# Patient Record
Sex: FEMALE | Race: WHITE | URBAN - METROPOLITAN AREA
[De-identification: names, ages, dates, MRNs, and addresses within clinical notes are randomized per-mention and may not be internally consistent; named-entity substitution may affect disease eponyms.]

---

## 2020-10-26 ENCOUNTER — OFFICE VISIT (OUTPATIENT)
Dept: URBAN - METROPOLITAN AREA CLINIC 128 | Facility: CLINIC | Age: 65
End: 2020-10-26
Payer: COMMERCIAL

## 2020-10-26 DIAGNOSIS — D50.0 IRON DEFICIENCY ANEMIA SECONDARY TO BLOOD LOSS (CHRONIC): ICD-10-CM

## 2020-10-26 DIAGNOSIS — B19.20 HEPATITIS C VIRUS INFECTION WITHOUT HEPATIC COMA, UNSPECIFIED CHRONICITY: ICD-10-CM

## 2020-10-26 PROCEDURE — G8484 FLU IMMUNIZE NO ADMIN: HCPCS | Performed by: INTERNAL MEDICINE

## 2020-10-26 PROCEDURE — 99204 OFFICE O/P NEW MOD 45 MIN: CPT | Performed by: INTERNAL MEDICINE

## 2020-10-26 PROCEDURE — 99244 OFF/OP CNSLTJ NEW/EST MOD 40: CPT | Performed by: INTERNAL MEDICINE

## 2020-10-26 RX ORDER — AMLODIPINE BESYLATE 5 MG/1
1 TABLET TABLET ORAL ONCE A DAY
Status: ACTIVE | COMMUNITY

## 2020-10-26 RX ORDER — OXYCODONE HYDROCHLORIDE 10 MG/1
1 TABLET AS NEEDED TABLET ORAL
Status: ACTIVE | COMMUNITY

## 2020-10-26 RX ORDER — LISINOPRIL 20 MG/1
1 TABLET TABLET ORAL ONCE A DAY
Status: ACTIVE | COMMUNITY

## 2020-10-26 RX ORDER — SUCRALFATE 1 G/1
1 TABLET ON AN EMPTY STOMACH TABLET ORAL TWICE A DAY
Status: ACTIVE | COMMUNITY

## 2020-10-26 RX ORDER — OMEPRAZOLE 40 MG/1
1 CAPSULE 30 MINUTES BEFORE MORNING MEAL CAPSULE, DELAYED RELEASE ORAL ONCE A DAY
Status: ACTIVE | COMMUNITY

## 2020-10-26 NOTE — HPI-TODAY'S VISIT:
Mrs. Spears is a 65 year old female who referred by Dr. Malagon. Mrs. Spears was admitted to [facility] 10/20/2020 to 10/22/2020 for acute GI bleed.    Mrs. Spears had endocarditis for which her AV was replaced 2008 and subsequent AVR for aortic stentosis in 5/2019.   Her melena started on 10/17/2020.   She had an EGD on 10/21/2020 with another practice for which she had an EGD performed that showed clean based esophageal ulcerations, irregular z-line, small hiatal hernia, and small fundic gland polyps. She was prescribed protonix 40mg BID and carafate QID.   She received 2 units of pRBC. Her HgB on discharge was 8.6.   She reports having Hep C and was treated in 2009 for 3 months with interferon and ribavirn; she is interested in getting a HCV viral load today.   Prior history is summarized below: -On 12/19/2016 EGD by Dr. Malloy at [place] showed distal esophagitis with linear ulceration, small hiatal hernia, and gastritis. -She had achalasia surgery at [facility]

## 2020-11-03 LAB
A/G RATIO: 1.9
ALBUMIN: 3.9
ALKALINE PHOSPHATASE: 56
ALT (SGPT): 18
AST (SGOT): 25
BILIRUBIN, TOTAL: 0.2
BUN/CREATININE RATIO: 18
BUN: 19
CALCIUM: 9.3
CARBON DIOXIDE, TOTAL: 26
CHLORIDE: 103
CREATININE: 1.03
EGFR IF AFRICN AM: 65
EGFR IF NONAFRICN AM: 57
FERRITIN, SERUM: 46
GLOBULIN, TOTAL: 2.1
GLUCOSE: 89
HCV AB: >11
HCV LOG10: (no result)
HEMATOCRIT: 30.4
HEMOGLOBIN: 9.9
HEPATITIS C QUANTITATION: (no result)
INTERPRETATION:: (no result)
IRON BIND.CAP.(TIBC): 294
IRON SATURATION: 13
IRON: 39
MCH: 30.7
MCHC: 32.6
MCV: 94
NRBC: (no result)
PLATELETS: 185
POTASSIUM: 4
PROTEIN, TOTAL: 6
RBC: 3.23
RDW: 13.6
SODIUM: 142
TEST INFORMATION:: (no result)
UIBC: 255
WBC: 3.8

## 2020-11-04 ENCOUNTER — TELEPHONE ENCOUNTER (OUTPATIENT)
Dept: URBAN - METROPOLITAN AREA CLINIC 92 | Facility: CLINIC | Age: 65
End: 2020-11-04

## 2020-11-04 PROBLEM — 724556004: Status: ACTIVE | Noted: 2020-10-26

## 2020-12-01 ENCOUNTER — OFFICE VISIT (OUTPATIENT)
Dept: URBAN - METROPOLITAN AREA SURGERY CENTER 31 | Facility: SURGERY CENTER | Age: 65
End: 2020-12-01
Payer: COMMERCIAL

## 2020-12-01 ENCOUNTER — CLAIMS CREATED FROM THE CLAIM WINDOW (OUTPATIENT)
Dept: URBAN - METROPOLITAN AREA CLINIC 4 | Facility: CLINIC | Age: 65
End: 2020-12-01
Payer: COMMERCIAL

## 2020-12-01 DIAGNOSIS — K63.89 OTHER SPECIFIED DISEASES OF INTESTINE: ICD-10-CM

## 2020-12-01 DIAGNOSIS — K31.89 OTHER DISEASES OF STOMACH AND DUODENUM: ICD-10-CM

## 2020-12-01 DIAGNOSIS — K31.89 ACQUIRED DEFORMITY OF DUODENUM: ICD-10-CM

## 2020-12-01 DIAGNOSIS — K63.5 BENIGN COLON POLYP: ICD-10-CM

## 2020-12-01 DIAGNOSIS — D50.9 ANEMIA, IRON DEFICIENCY: ICD-10-CM

## 2020-12-01 PROCEDURE — 88342 IMHCHEM/IMCYTCHM 1ST ANTB: CPT | Performed by: PATHOLOGY

## 2020-12-01 PROCEDURE — 45380 COLONOSCOPY AND BIOPSY: CPT | Performed by: INTERNAL MEDICINE

## 2020-12-01 PROCEDURE — G9937 DIG OR SURV COLSCO: HCPCS | Performed by: INTERNAL MEDICINE

## 2020-12-01 PROCEDURE — 88305 TISSUE EXAM BY PATHOLOGIST: CPT | Performed by: PATHOLOGY

## 2020-12-01 PROCEDURE — G8907 PT DOC NO EVENTS ON DISCHARG: HCPCS | Performed by: INTERNAL MEDICINE

## 2020-12-01 PROCEDURE — 43239 EGD BIOPSY SINGLE/MULTIPLE: CPT | Performed by: INTERNAL MEDICINE

## 2020-12-01 PROCEDURE — 45385 COLONOSCOPY W/LESION REMOVAL: CPT | Performed by: INTERNAL MEDICINE

## 2020-12-01 PROCEDURE — 88312 SPECIAL STAINS GROUP 1: CPT | Performed by: PATHOLOGY

## 2020-12-01 RX ORDER — OMEPRAZOLE 40 MG/1
1 CAPSULE 30 MINUTES BEFORE MORNING MEAL CAPSULE, DELAYED RELEASE ORAL ONCE A DAY
Status: ACTIVE | COMMUNITY

## 2020-12-01 RX ORDER — LISINOPRIL 20 MG/1
1 TABLET TABLET ORAL ONCE A DAY
Status: ACTIVE | COMMUNITY

## 2020-12-01 RX ORDER — OXYCODONE HYDROCHLORIDE 10 MG/1
1 TABLET AS NEEDED TABLET ORAL
Status: ACTIVE | COMMUNITY

## 2020-12-01 RX ORDER — SUCRALFATE 1 G/1
1 TABLET ON AN EMPTY STOMACH TABLET ORAL TWICE A DAY
Status: ACTIVE | COMMUNITY

## 2020-12-01 RX ORDER — AMLODIPINE BESYLATE 5 MG/1
1 TABLET TABLET ORAL ONCE A DAY
Status: ACTIVE | COMMUNITY

## 2021-01-21 ENCOUNTER — WEB ENCOUNTER (OUTPATIENT)
Dept: URBAN - METROPOLITAN AREA CLINIC 128 | Facility: CLINIC | Age: 66
End: 2021-01-21

## 2021-01-21 ENCOUNTER — WEB ENCOUNTER (OUTPATIENT)
Dept: URBAN - METROPOLITAN AREA CLINIC 19 | Facility: CLINIC | Age: 66
End: 2021-01-21

## 2021-02-02 ENCOUNTER — WEB ENCOUNTER (OUTPATIENT)
Dept: URBAN - METROPOLITAN AREA CLINIC 128 | Facility: CLINIC | Age: 66
End: 2021-02-02

## 2021-02-11 ENCOUNTER — WEB ENCOUNTER (OUTPATIENT)
Dept: URBAN - METROPOLITAN AREA CLINIC 19 | Facility: CLINIC | Age: 66
End: 2021-02-11

## 2021-02-11 ENCOUNTER — OFFICE VISIT (OUTPATIENT)
Dept: URBAN - METROPOLITAN AREA CLINIC 19 | Facility: CLINIC | Age: 66
End: 2021-02-11
Payer: COMMERCIAL

## 2021-02-11 DIAGNOSIS — R12 HEARTBURN: ICD-10-CM

## 2021-02-11 PROCEDURE — G8427 DOCREV CUR MEDS BY ELIG CLIN: HCPCS | Performed by: INTERNAL MEDICINE

## 2021-02-11 PROCEDURE — 1036F TOBACCO NON-USER: CPT | Performed by: INTERNAL MEDICINE

## 2021-02-11 PROCEDURE — 99214 OFFICE O/P EST MOD 30 MIN: CPT | Performed by: INTERNAL MEDICINE

## 2021-02-11 PROCEDURE — G8484 FLU IMMUNIZE NO ADMIN: HCPCS | Performed by: INTERNAL MEDICINE

## 2021-02-11 RX ORDER — LISINOPRIL 20 MG/1
1 TABLET TABLET ORAL ONCE A DAY
Status: ACTIVE | COMMUNITY

## 2021-02-11 RX ORDER — SUCRALFATE 1 G/1
1 TABLET ON AN EMPTY STOMACH TABLET ORAL TWICE A DAY
Status: ACTIVE | COMMUNITY

## 2021-02-11 RX ORDER — AMLODIPINE BESYLATE 5 MG/1
1 TABLET TABLET ORAL ONCE A DAY
Status: ACTIVE | COMMUNITY

## 2021-02-11 RX ORDER — OMEPRAZOLE 40 MG/1
1 CAPSULE 30 MINUTES BEFORE MORNING MEAL CAPSULE, DELAYED RELEASE ORAL ONCE A DAY
Status: ACTIVE | COMMUNITY

## 2021-02-11 RX ORDER — OXYCODONE HYDROCHLORIDE 10 MG/1
1 TABLET AS NEEDED TABLET ORAL
Status: ACTIVE | COMMUNITY

## 2021-02-11 NOTE — HPI-TODAY'S VISIT:
Mrs. Spears is a 65 year old female who was last seen in GI clinic on 10/26/2020.    Labs on 11/3/2020 showed HgB 9.9 (normal range 11.1-15.9), AST 25, ALT 18, Alk phos 56, T bili 0.2,  ferritin 46 (normal range is ) iron saturation 13%. On 11/3/2020 HCV viral load was negative.   On 12/1/2020 she had an EGD and colonoscopy. The EGD showed medium sized hiatal hernia, endoscopic appearance of prior GE junction surgery with loosen Nissen fundoplication, 10 mm gastric antral polyp, and mild gastritis. The colonoscopy showed a 6 mm ascending colon polyp and a 3 mm sigmoid colon polyp. Repeat colonoscopy recommended in 5 years.   The gastric biopsies were negative for H. pylori and duodena biopsies were negative for Celiac disease. Ascending colon poyps were benign mucosal polyp and hyperplastic polyp.   She reports taking omeprazole 40mg daily and carafate 1 gram BID. She reports having persists reflux symptoms.   Prior history is summarized below: -On 12/19/2016 EGD by Dr. Malloy at [place] showed distal esophagitis with linear ulceration, small hiatal hernia, and gastritis. -She had achalasia surgery at [place] -Mrs. Spears was admitted to [facility] 10/20/2020 to 10/22/2020 for acute GI bleed.   -Mrs. Spears had endocarditis for which her AV was replaced  2008 and subsequent AVR for aortic stentosis in 5/2019.  -Her melena started on 10/17/2020. She had an EGD on 10/21/2020 with another practice for which she had an EGD performed that showed clean based esophageal ulcerations, irregular z-line, small hiatal hernia, and small fundic gland polyps. She was prescribed protonix 40mg BID and carafate QID. She received 2 units of pRBC. Her HgB on discharge was 8.6.  -She reports having Hep C and was treated in 2009 for 3 months with interferon and ribavirin.

## 2021-02-26 ENCOUNTER — TELEPHONE ENCOUNTER (OUTPATIENT)
Dept: URBAN - METROPOLITAN AREA CLINIC 105 | Facility: CLINIC | Age: 66
End: 2021-02-26

## 2021-03-01 ENCOUNTER — TELEPHONE ENCOUNTER (OUTPATIENT)
Dept: URBAN - METROPOLITAN AREA CLINIC 92 | Facility: CLINIC | Age: 66
End: 2021-03-01

## 2021-03-01 ENCOUNTER — LAB OUTSIDE AN ENCOUNTER (OUTPATIENT)
Dept: URBAN - METROPOLITAN AREA CLINIC 19 | Facility: CLINIC | Age: 66
End: 2021-03-01

## 2021-03-10 ENCOUNTER — OFFICE VISIT (OUTPATIENT)
Dept: URBAN - METROPOLITAN AREA MEDICAL CENTER 28 | Facility: MEDICAL CENTER | Age: 66
End: 2021-03-10
Payer: COMMERCIAL

## 2021-03-10 DIAGNOSIS — R12 BURNING REFLUX: ICD-10-CM

## 2021-03-10 DIAGNOSIS — K44.9 DIAPHRAGMATIC HERNIA: ICD-10-CM

## 2021-03-10 PROCEDURE — 91010 ESOPHAGUS MOTILITY STUDY: CPT | Performed by: INTERNAL MEDICINE

## 2021-03-15 ENCOUNTER — TELEPHONE ENCOUNTER (OUTPATIENT)
Dept: URBAN - METROPOLITAN AREA CLINIC 19 | Facility: CLINIC | Age: 66
End: 2021-03-15

## 2021-03-15 ENCOUNTER — TELEPHONE ENCOUNTER (OUTPATIENT)
Dept: URBAN - METROPOLITAN AREA CLINIC 92 | Facility: CLINIC | Age: 66
End: 2021-03-15

## 2021-03-17 ENCOUNTER — TELEPHONE ENCOUNTER (OUTPATIENT)
Dept: URBAN - METROPOLITAN AREA CLINIC 19 | Facility: CLINIC | Age: 66
End: 2021-03-17

## 2021-03-26 ENCOUNTER — OFFICE VISIT (OUTPATIENT)
Dept: URBAN - METROPOLITAN AREA MEDICAL CENTER 25 | Facility: MEDICAL CENTER | Age: 66
End: 2021-03-26
Payer: COMMERCIAL

## 2021-03-26 DIAGNOSIS — R93.3 ABN FINDINGS-GI TRACT: ICD-10-CM

## 2021-03-26 DIAGNOSIS — K31.89 ACQUIRED DEFORMITY OF DUODENUM: ICD-10-CM

## 2021-03-26 DIAGNOSIS — R13.10 ABNORMAL SWALLOWING: ICD-10-CM

## 2021-03-26 PROCEDURE — 43249 ESOPH EGD DILATION <30 MM: CPT | Performed by: INTERNAL MEDICINE

## 2021-03-26 PROCEDURE — 43239 EGD BIOPSY SINGLE/MULTIPLE: CPT | Performed by: INTERNAL MEDICINE

## 2021-03-26 RX ORDER — SUCRALFATE 1 G/1
1 TABLET ON AN EMPTY STOMACH TABLET ORAL TWICE A DAY
Status: ACTIVE | COMMUNITY

## 2021-03-26 RX ORDER — OXYCODONE HYDROCHLORIDE 10 MG/1
1 TABLET AS NEEDED TABLET ORAL
Status: ACTIVE | COMMUNITY

## 2021-03-26 RX ORDER — AMLODIPINE BESYLATE 5 MG/1
1 TABLET TABLET ORAL ONCE A DAY
Status: ACTIVE | COMMUNITY

## 2021-03-26 RX ORDER — LISINOPRIL 20 MG/1
1 TABLET TABLET ORAL ONCE A DAY
Status: ACTIVE | COMMUNITY

## 2021-03-26 RX ORDER — OMEPRAZOLE 40 MG/1
1 CAPSULE 30 MINUTES BEFORE MORNING MEAL CAPSULE, DELAYED RELEASE ORAL ONCE A DAY
Status: ACTIVE | COMMUNITY

## 2021-04-01 ENCOUNTER — TELEPHONE ENCOUNTER (OUTPATIENT)
Dept: URBAN - METROPOLITAN AREA CLINIC 92 | Facility: CLINIC | Age: 66
End: 2021-04-01

## 2021-04-02 ENCOUNTER — OFFICE VISIT (OUTPATIENT)
Dept: URBAN - METROPOLITAN AREA SURGERY CENTER 31 | Facility: SURGERY CENTER | Age: 66
End: 2021-04-02

## 2021-05-19 ENCOUNTER — OFFICE VISIT (OUTPATIENT)
Dept: URBAN - METROPOLITAN AREA CLINIC 19 | Facility: CLINIC | Age: 66
End: 2021-05-19
Payer: COMMERCIAL

## 2021-05-19 DIAGNOSIS — R12 HEARTBURN: ICD-10-CM

## 2021-05-19 DIAGNOSIS — K22.0 ACHALASIA: ICD-10-CM

## 2021-05-19 PROCEDURE — 99214 OFFICE O/P EST MOD 30 MIN: CPT | Performed by: INTERNAL MEDICINE

## 2021-05-19 RX ORDER — SUCRALFATE 1 G/1
1 TABLET ON AN EMPTY STOMACH TABLET ORAL TWICE A DAY
Status: ACTIVE | COMMUNITY

## 2021-05-19 RX ORDER — OMEPRAZOLE 40 MG/1
1 CAPSULE 30 MINUTES BEFORE MORNING MEAL CAPSULE, DELAYED RELEASE ORAL ONCE A DAY
Status: ACTIVE | COMMUNITY

## 2021-05-19 RX ORDER — AMLODIPINE BESYLATE 5 MG/1
1 TABLET TABLET ORAL ONCE A DAY
Status: ACTIVE | COMMUNITY

## 2021-05-19 RX ORDER — PANTOPRAZOLE SODIUM 40 MG/1
1 TABLET TABLET, DELAYED RELEASE ORAL BID
Qty: 60 TABLET | Refills: 5 | OUTPATIENT
Start: 2021-05-19

## 2021-05-19 RX ORDER — OXYCODONE HYDROCHLORIDE 10 MG/1
1 TABLET AS NEEDED TABLET ORAL
Status: ACTIVE | COMMUNITY

## 2021-05-19 RX ORDER — LISINOPRIL 20 MG/1
1 TABLET TABLET ORAL ONCE A DAY
Status: ACTIVE | COMMUNITY

## 2021-05-19 NOTE — HPI-TODAY'S VISIT:
Mrs. Spears is a 66 year old female who was last seen in GI clinic on 2/11/2021.     She reports taking omeprazole 40mg BID and carafate 1 gram BID. She reports having persists reflux symptoms.   On 3/1/2021 she had a barium swallow performed which showed "mid to distal esophageal stricture above the level of a small hiatal hernia. The 12.5 mm compressed barium tablet comes lodged in this location and does not pass into the stomach. Consider corrleation with endoscopy. Severe esophageal dysmotility. Minimal gastroesophageal reflux."   On 3/10/2021 she had an esophageal manometry which showed "overall, abnormal esophageal function study for low LESP and low amplitude contractions that are aperistaltic with decrease in viscous bolus transit. These findings can be seen with pump failure related to GERD, scleroderma or in previously treated achalasia. Correlation with barium swallow suggested."   On 3/26/2021 she had an EGD which showed a medium sized hiatal hernia, dilation to 20 mm was performed, and a 10 mm gastric antral polyp was found in the gastric antrum. Gastric biopsies were negative for H. pylori and lower esophageal biopsies were unremarkable.   She reports her dysphagia has improved since her EGD but continues to have reflux symptoms.   Prior history is summarized below: -On 12/19/2016 EGD by Dr. Malloy at [place] showed distal esophagitis with linear ulceration, small hiatal hernia, and gastritis. -She had achalasia surgery at [place] -Mrs. Spears was admitted to [facility] 10/20/2020 to 10/22/2020 for acute GI bleed. -Mrs. Spears had endocarditis for which her AV was replaced  2008 and subsequent AVR for aortic stentosis in 5/2019.  -Her melena started on 10/17/2020. She had an EGD on 10/21/2020 with another practice for which she had an EGD performed that showed clean based esophageal ulcerations, irregular z-line, small hiatal hernia, and small fundic gland polyps. She was prescribed protonix 40mg BID and carafate QID. She received 2 units of pRBC. Her HgB on discharge was 8.6.  -She reports having Hep C and was treated in 2009 for 3 months with interferon and ribavirin. Labs on 11/3/2020 showed HgB 9.9 (normal range 11.1-15.9), AST 25, ALT 18, Alk phos 56, T bili 0.2,  ferritin 46 (normal range is ) iron saturation 13%. On 11/3/2020 HCV viral load was negative.  -On 12/1/2020 she had an EGD and colonoscopy. The EGD showed medium sized hiatal hernia, endoscopic appearance of prior GE junction surgery with loosen Nissen fundoplication, 10 mm gastric antral polyp, and mild gastritis. The colonoscopy showed a 6 mm ascending colon polyp and a 3 mm sigmoid colon polyp. Repeat colonoscopy recommended in 5 years. The gastric biopsies were negative for H. pylori and duodenal biopsies were negative for Celiac disease. Ascending colon polyps were benign mucosal polyp and hyperplastic polyp.

## 2021-09-03 ENCOUNTER — TELEPHONE ENCOUNTER (OUTPATIENT)
Dept: URBAN - METROPOLITAN AREA CLINIC 92 | Facility: CLINIC | Age: 66
End: 2021-09-03

## 2022-11-04 ENCOUNTER — OFFICE VISIT (OUTPATIENT)
Dept: URBAN - METROPOLITAN AREA CLINIC 128 | Facility: CLINIC | Age: 67
End: 2022-11-04
Payer: COMMERCIAL

## 2022-11-04 VITALS
BODY MASS INDEX: 38 KG/M2 | HEART RATE: 71 BPM | SYSTOLIC BLOOD PRESSURE: 143 MMHG | TEMPERATURE: 97.8 F | HEIGHT: 55 IN | DIASTOLIC BLOOD PRESSURE: 71 MMHG | WEIGHT: 164.2 LBS

## 2022-11-04 DIAGNOSIS — K22.0 ACHALASIA: ICD-10-CM

## 2022-11-04 DIAGNOSIS — R13.19 ESOPHAGEAL DYSPHAGIA: ICD-10-CM

## 2022-11-04 PROBLEM — 45564002: Status: ACTIVE | Noted: 2021-05-19

## 2022-11-04 PROCEDURE — 99214 OFFICE O/P EST MOD 30 MIN: CPT | Performed by: INTERNAL MEDICINE

## 2022-11-04 RX ORDER — LISINOPRIL 20 MG/1
1 TABLET TABLET ORAL ONCE A DAY
Status: ACTIVE | COMMUNITY

## 2022-11-04 RX ORDER — AMLODIPINE BESYLATE 5 MG/1
1 TABLET TABLET ORAL ONCE A DAY
Status: ACTIVE | COMMUNITY

## 2022-11-04 RX ORDER — OMEPRAZOLE 40 MG/1
1 CAPSULE 30 MINUTES BEFORE MORNING MEAL CAPSULE, DELAYED RELEASE ORAL ONCE A DAY
Status: ACTIVE | COMMUNITY

## 2022-11-04 RX ORDER — PANTOPRAZOLE SODIUM 40 MG/1
1 TABLET TABLET, DELAYED RELEASE ORAL BID
Qty: 60 TABLET | Refills: 5 | Status: ACTIVE | COMMUNITY
Start: 2021-05-19

## 2022-11-04 RX ORDER — OXYCODONE HYDROCHLORIDE 10 MG/1
1 TABLET AS NEEDED TABLET ORAL
Status: ACTIVE | COMMUNITY

## 2022-11-04 RX ORDER — SUCRALFATE 1 G/1
1 TABLET ON AN EMPTY STOMACH TABLET ORAL TWICE A DAY
Status: ACTIVE | COMMUNITY

## 2022-11-04 NOTE — HPI-TODAY'S VISIT:
Mrs. Spears is a 67 year old female who was last seen in GI clinic on 5/19/2021.     She was referred to [facility] who she saw and had her surgery on 12/2021. We do not have notes of the surgery at this time.   She reports getting relief after her surgery but in the last couple of months her symptoms have been getting progressively worse. She reports her issues are predominately with solids.   Prior history is summarized below: -On 12/19/2016 EGD by Dr. Malloy at [place] showed distal esophagitis with linear ulceration, small hiatal hernia, and gastritis. -She had achalasia surgery at [place] -Mrs. Spears was admitted to [facility] 10/20/2020 to 10/22/2020 for acute GI bleed. -Mrs. Spears had endocarditis for which her AV was replaced  2008 and subsequent AVR for aortic stentosis in 5/2019.  -Her melena started on 10/17/2020. She had an EGD on 10/21/2020 with another practice for which she had an EGD performed that showed clean based esophageal ulcerations, irregular z-line, small hiatal hernia, and small fundic gland polyps. She was prescribed protonix 40mg BID and carafate QID. She received 2 units of pRBC. Her HgB on discharge was 8.6.  -She reports having Hep C and was treated in 2009 for 3 months with interferon and ribavirin. Labs on 11/3/2020 showed HgB 9.9 (normal range 11.1-15.9), AST 25, ALT 18, Alk phos 56, T bili 0.2,  ferritin 46 (normal range is ) iron saturation 13%. On 11/3/2020 HCV viral load was negative.  -On 12/1/2020 she had an EGD and colonoscopy. The EGD showed medium sized hiatal hernia, endoscopic appearance of prior GE junction surgery with loosen Nissen fundoplication, 10 mm gastric antral polyp, and mild gastritis. The colonoscopy showed a 6 mm ascending colon polyp and a 3 mm sigmoid colon polyp. Repeat colonoscopy recommended in 5 years. The gastric biopsies were negative for H. pylori and duodenal biopsies were negative for Celiac disease. Ascending colon polyps were benign mucosal polyp and hyperplastic polyp. -On 3/1/2021 she had a barium swallow performed which showed "mid to distal esophageal stricture above the level of a small hiatal hernia. The 12.5 mm compressed barium tablet comes lodged in this location and does not pass into the stomach. Consider corrleation with endoscopy. Severe esophageal dysmotility. Minimal gastroesophageal reflux."  -On 3/10/2021 she had an esophageal manometry which showed "overall, abnormal esophageal function study for low LESP and low amplitude contractions that are aperistaltic with decrease in viscous bolus transit. These findings can be seen with pump failure related to GERD, scleroderma or in previously treated achalasia. Correlation with barium swallow suggested."  -On 3/26/2021 she had an EGD which showed a medium sized hiatal hernia, dilation to 20 mm was performed, and a 10 mm gastric antral polyp was found in the gastric antrum. Gastric biopsies were negative for H. pylori and lower esophageal biopsies were unremarkable.    She reports her dysphagia has improved since her EGD but continues to have reflux symptoms.

## 2022-11-21 ENCOUNTER — TELEPHONE ENCOUNTER (OUTPATIENT)
Dept: URBAN - METROPOLITAN AREA CLINIC 128 | Facility: CLINIC | Age: 67
End: 2022-11-21

## 2022-11-21 RX ORDER — ONDANSETRON 4 MG/1
1 TABLET ON THE TONGUE AND ALLOW TO DISSOLVE TABLET, ORALLY DISINTEGRATING ORAL EVERY 6 HOURS
Qty: 60 | Refills: 1 | OUTPATIENT

## 2022-12-14 ENCOUNTER — LAB OUTSIDE AN ENCOUNTER (OUTPATIENT)
Dept: URBAN - METROPOLITAN AREA CLINIC 19 | Facility: CLINIC | Age: 67
End: 2022-12-14

## 2022-12-27 ENCOUNTER — TELEPHONE ENCOUNTER (OUTPATIENT)
Dept: URBAN - METROPOLITAN AREA CLINIC 92 | Facility: CLINIC | Age: 67
End: 2022-12-27

## 2023-01-23 ENCOUNTER — TELEPHONE ENCOUNTER (OUTPATIENT)
Dept: URBAN - METROPOLITAN AREA CLINIC 19 | Facility: CLINIC | Age: 68
End: 2023-01-23

## 2023-02-09 ENCOUNTER — TELEPHONE ENCOUNTER (OUTPATIENT)
Dept: URBAN - METROPOLITAN AREA CLINIC 19 | Facility: CLINIC | Age: 68
End: 2023-02-09

## 2023-02-14 ENCOUNTER — OFFICE VISIT (OUTPATIENT)
Dept: URBAN - METROPOLITAN AREA CLINIC 19 | Facility: CLINIC | Age: 68
End: 2023-02-14
Payer: COMMERCIAL

## 2023-02-14 VITALS
TEMPERATURE: 96.6 F | DIASTOLIC BLOOD PRESSURE: 88 MMHG | WEIGHT: 164.2 LBS | OXYGEN SATURATION: 96 % | HEIGHT: 55 IN | SYSTOLIC BLOOD PRESSURE: 156 MMHG | HEART RATE: 54 BPM | BODY MASS INDEX: 38 KG/M2

## 2023-02-14 DIAGNOSIS — R13.19 ESOPHAGEAL DYSPHAGIA: ICD-10-CM

## 2023-02-14 DIAGNOSIS — R11.0 NAUSEA: ICD-10-CM

## 2023-02-14 PROBLEM — 40890009: Status: ACTIVE | Noted: 2023-02-10

## 2023-02-14 PROCEDURE — 99213 OFFICE O/P EST LOW 20 MIN: CPT | Performed by: NURSE PRACTITIONER

## 2023-02-14 RX ORDER — ONDANSETRON 4 MG/1
1 TABLET ON THE TONGUE AND ALLOW TO DISSOLVE TABLET, ORALLY DISINTEGRATING ORAL EVERY 6 HOURS
Qty: 60 | Refills: 1 | Status: ACTIVE | COMMUNITY

## 2023-02-14 RX ORDER — OMEPRAZOLE 40 MG/1
1 CAPSULE 30 MINUTES BEFORE MORNING MEAL CAPSULE, DELAYED RELEASE ORAL ONCE A DAY
Status: ACTIVE | COMMUNITY

## 2023-02-14 RX ORDER — AMLODIPINE BESYLATE 5 MG/1
1 TABLET TABLET ORAL ONCE A DAY
Status: ACTIVE | COMMUNITY

## 2023-02-14 RX ORDER — LISINOPRIL 20 MG/1
1 TABLET TABLET ORAL ONCE A DAY
Status: ACTIVE | COMMUNITY

## 2023-02-14 RX ORDER — ONDANSETRON 4 MG/1
1 TABLET ON THE TONGUE AND ALLOW TO DISSOLVE TABLET, ORALLY DISINTEGRATING ORAL EVERY 6 HOURS
Qty: 60 | Refills: 3

## 2023-02-14 RX ORDER — OXYCODONE HYDROCHLORIDE 10 MG/1
1 TABLET AS NEEDED TABLET ORAL
Status: ACTIVE | COMMUNITY

## 2023-02-14 NOTE — HPI-TODAY'S VISIT:
Ms. Spears is a 67-year-old female who presents today for c/o loss of appetite and dysphagia. Daughter present on phone during entire clinic visit.  She reports dysphagia with solids, not liquids. Has had EGD with dilation in the past. She reports having some relief with this. She reports hiatal hernia repair 12/2021 but still with ongoing issues with feeling discomfort and regurgiation of food. Does experience nausea and takes zofran or phenergan as needed. Continues on Omeprazole 40mg daily. Able to take in liquids, ice cream, soup, and smoothies. Tries to eat a regular meal but only able to take in a few bites. States she immediately regurgitates foods, feels as if it gets stuck in her upper esophagus. No issues with bowel movements, she reports she has a regular BM everyday. Weight went down from 162 pounds to 158 pounds in past 3 months.  She has an EGD with dilation scheduled with Dr. Castillo on 3/21/2023 at [facility].   Prior procedures: 12/6/2021: underwent redo laparoscopic hiatal hernia repair with redo Heller myotomy and gastric wedge resection. 3/26/2021: EGD by Dr. Castillo - medium sized hiatal hernia, single gastric polyp, normal duodenum, dilation performed in the lower third of the esophagus.  Path: Gastric antrum-mucosa has regenerative changes suggestive of reactive gastropathy, no significant inflammation, no H. pylori-like organisms identified.  Lower third of esophagus-squamous mucosa no significant inflammation, no features of EOE..

## 2023-03-21 ENCOUNTER — OFFICE VISIT (OUTPATIENT)
Dept: URBAN - METROPOLITAN AREA MEDICAL CENTER 25 | Facility: MEDICAL CENTER | Age: 68
End: 2023-03-21

## 2023-03-21 ENCOUNTER — OFFICE VISIT (OUTPATIENT)
Dept: URBAN - METROPOLITAN AREA MEDICAL CENTER 25 | Facility: MEDICAL CENTER | Age: 68
End: 2023-03-21
Payer: COMMERCIAL

## 2023-03-21 DIAGNOSIS — K22.89 DILATATION OF ESOPHAGUS: ICD-10-CM

## 2023-03-21 DIAGNOSIS — R13.10 ABNORMAL DEGLUTITION: ICD-10-CM

## 2023-03-21 DIAGNOSIS — Q39.9 CONGENITAL DUPLICATION OF ESOPHAGUS: ICD-10-CM

## 2023-03-21 PROCEDURE — 43239 EGD BIOPSY SINGLE/MULTIPLE: CPT | Performed by: INTERNAL MEDICINE

## 2023-03-21 PROCEDURE — 43249 ESOPH EGD DILATION <30 MM: CPT | Performed by: INTERNAL MEDICINE

## 2023-03-21 RX ORDER — OXYCODONE HYDROCHLORIDE 10 MG/1
1 TABLET AS NEEDED TABLET ORAL
Status: ACTIVE | COMMUNITY

## 2023-03-21 RX ORDER — ONDANSETRON 4 MG/1
1 TABLET ON THE TONGUE AND ALLOW TO DISSOLVE TABLET, ORALLY DISINTEGRATING ORAL EVERY 6 HOURS
Qty: 60 | Refills: 3 | Status: ACTIVE | COMMUNITY

## 2023-03-21 RX ORDER — OMEPRAZOLE 40 MG/1
1 CAPSULE 30 MINUTES BEFORE MORNING MEAL CAPSULE, DELAYED RELEASE ORAL ONCE A DAY
Status: ACTIVE | COMMUNITY

## 2023-03-21 RX ORDER — LISINOPRIL 20 MG/1
1 TABLET TABLET ORAL ONCE A DAY
Status: ACTIVE | COMMUNITY

## 2023-03-21 RX ORDER — AMLODIPINE BESYLATE 5 MG/1
1 TABLET TABLET ORAL ONCE A DAY
Status: ACTIVE | COMMUNITY

## 2023-03-24 ENCOUNTER — TELEPHONE ENCOUNTER (OUTPATIENT)
Dept: URBAN - METROPOLITAN AREA CLINIC 35 | Facility: CLINIC | Age: 68
End: 2023-03-24

## 2023-03-24 ENCOUNTER — TELEPHONE ENCOUNTER (OUTPATIENT)
Dept: URBAN - METROPOLITAN AREA CLINIC 19 | Facility: CLINIC | Age: 68
End: 2023-03-24

## 2023-04-12 ENCOUNTER — OFFICE VISIT (OUTPATIENT)
Dept: URBAN - METROPOLITAN AREA CLINIC 19 | Facility: CLINIC | Age: 68
End: 2023-04-12
Payer: COMMERCIAL

## 2023-04-12 VITALS
WEIGHT: 160.2 LBS | HEART RATE: 74 BPM | TEMPERATURE: 98.4 F | OXYGEN SATURATION: 95 % | SYSTOLIC BLOOD PRESSURE: 120 MMHG | HEIGHT: 55 IN | DIASTOLIC BLOOD PRESSURE: 80 MMHG | BODY MASS INDEX: 37.08 KG/M2

## 2023-04-12 DIAGNOSIS — R11.10 REGURGITATION OF FOOD: ICD-10-CM

## 2023-04-12 DIAGNOSIS — R13.19 ESOPHAGEAL DYSPHAGIA: ICD-10-CM

## 2023-04-12 DIAGNOSIS — K21.9 GASTROESOPHAGEAL REFLUX DISEASE WITHOUT ESOPHAGITIS: ICD-10-CM

## 2023-04-12 PROBLEM — 266435005: Status: ACTIVE | Noted: 2023-04-12

## 2023-04-12 PROCEDURE — 99214 OFFICE O/P EST MOD 30 MIN: CPT | Performed by: NURSE PRACTITIONER

## 2023-04-12 RX ORDER — OMEPRAZOLE 40 MG/1
1 CAPSULE 30 MINUTES BEFORE MORNING MEAL CAPSULE, DELAYED RELEASE ORAL ONCE A DAY
Status: ACTIVE | COMMUNITY

## 2023-04-12 RX ORDER — LISINOPRIL 20 MG/1
1 TABLET TABLET ORAL ONCE A DAY
Status: ACTIVE | COMMUNITY

## 2023-04-12 RX ORDER — OXYCODONE HYDROCHLORIDE 10 MG/1
1 TABLET AS NEEDED TABLET ORAL
Status: ACTIVE | COMMUNITY

## 2023-04-12 RX ORDER — AMLODIPINE BESYLATE 5 MG/1
1 TABLET TABLET ORAL ONCE A DAY
Status: ACTIVE | COMMUNITY

## 2023-04-12 RX ORDER — ONDANSETRON 4 MG/1
1 TABLET ON THE TONGUE AND ALLOW TO DISSOLVE TABLET, ORALLY DISINTEGRATING ORAL EVERY 6 HOURS
Qty: 60 | Refills: 3 | Status: ACTIVE | COMMUNITY

## 2023-04-12 NOTE — HPI-TODAY'S VISIT:
Ms. Spears is a 68-year-old female with PMH of hiatal hernia repair 12/2021, EGD with dilations in the past who presents today for follow-up.  Last seen in clinic on 2/14/2023 by me for complaints of nausea and esophageal dysphagia.  Prescribed Zofran PRN and EGD was ordered.  EGD was done by Dr. Castillo on 3/21/2023.  Tortuous esophagus.  Dilated.  Normal stomach.  Normal duodenum.  Path from lower third esophagus biopsy with squamous mucosa exhibiting focally increased papillary stromal height, possibly related to gastroesophageal reflux; no evidence of reflux esophagitis or EOE.   Today, she states after the procedure it helped a little but but not much. Still experiecning regurgitation of food. Eats cereal, hummus, soups, meat stew, smoothies, oatmeal. Maintaing a gerd diet mostly. Eats 3 times/day. Drinks plenty of water. Continues on Omeprazole 40mg BID. States she has been on this for many years.  Reports discomfort epigastric, still feels discomfort when she swallows, states some days are better. She had an appt with her PCP last week but did not go. Was supposed to have labs. She is looking for a new PCP. Occasional nausea but no vomiting, just mostly regurgitates sometimes she feels because she feels foods dont go down. Issue mainly with solids, but sometimes liquids.   BMs are good, reports a little constipation lately, but elieved with dulcoloax.      Prior procedures: 12/6/2021: underwent redo laparoscopic hiatal hernia repair with redo Heller myotomy and gastric wedge resection. 3/26/2021: EGD by Dr. Castillo - medium sized hiatal hernia, single gastric polyp, normal duodenum, dilation performed in the lower third of the esophagus.  Path: Gastric antrum-mucosa has regenerative changes suggestive of reactive gastropathy, no significant inflammation, no H. pylori-like organisms identified.  Lower third of esophagus-squamous mucosa no significant inflammation, no features of EOE.

## 2023-04-13 LAB
A/G RATIO: 1.6
ALBUMIN: 4.1
ALKALINE PHOSPHATASE: 66
ALT (SGPT): 13
AST (SGOT): 19
BILIRUBIN, TOTAL: 0.3
BUN/CREATININE RATIO: 25
BUN: 28
CALCIUM: 9.2
CARBON DIOXIDE, TOTAL: 29
CHLORIDE: 100
CREATININE: 1.11
EGFR: 54
GLOBULIN, TOTAL: 2.6
GLUCOSE: 96
HEMATOCRIT: 37.6
HEMOGLOBIN: 12.8
LIPASE: 24
MCH: 29.9
MCHC: 34
MCV: 87.9
MPV: 10
PLATELET COUNT: 156
POTASSIUM: 3.9
PROTEIN, TOTAL: 6.7
RDW: 12.7
RED BLOOD CELL COUNT: 4.28
SODIUM: 139
WHITE BLOOD CELL COUNT: 5

## 2023-04-15 ENCOUNTER — TELEPHONE ENCOUNTER (OUTPATIENT)
Dept: URBAN - METROPOLITAN AREA CLINIC 35 | Facility: CLINIC | Age: 68
End: 2023-04-15

## 2024-01-17 ENCOUNTER — TELEPHONE ENCOUNTER (OUTPATIENT)
Dept: URBAN - METROPOLITAN AREA CLINIC 19 | Facility: CLINIC | Age: 69
End: 2024-01-17

## 2024-01-17 ENCOUNTER — ERX REFILL RESPONSE (OUTPATIENT)
Dept: URBAN - METROPOLITAN AREA CLINIC 19 | Facility: CLINIC | Age: 69
End: 2024-01-17

## 2024-01-17 RX ORDER — ONDANSETRON 4 MG/1
1 TABLET ON THE TONGUE AND ALLOW TO DISSOLVE TABLET, ORALLY DISINTEGRATING ORAL EVERY 6 HOURS
Qty: 60 | Refills: 3 | OUTPATIENT

## 2024-01-17 RX ORDER — OMEPRAZOLE 40 MG/1: 1 CAPSULE 30 MINUTES BEFORE MORNING MEAL CAPSULE, DELAYED RELEASE ORAL ONCE A DAY

## 2024-01-17 RX ORDER — ONDANSETRON 4 MG/1
DISSOLVE 1 TABLET ON THE TONGUE EVERY 6 HOURS TABLET, ORALLY DISINTEGRATING ORAL
Qty: 60 TABLET | Refills: 0 | OUTPATIENT

## 2024-04-15 ENCOUNTER — OV EP (OUTPATIENT)
Dept: URBAN - METROPOLITAN AREA CLINIC 19 | Facility: CLINIC | Age: 69
End: 2024-04-15

## 2024-04-15 RX ORDER — ONDANSETRON 4 MG/1
DISSOLVE 1 TABLET ON THE TONGUE EVERY 6 HOURS TABLET, ORALLY DISINTEGRATING ORAL
Qty: 60 TABLET | Refills: 0 | Status: ACTIVE | COMMUNITY

## 2024-04-15 RX ORDER — OMEPRAZOLE 40 MG/1
1 CAPSULE 30 MINUTES BEFORE MORNING MEAL CAPSULE, DELAYED RELEASE ORAL ONCE A DAY
Status: ACTIVE | COMMUNITY

## 2024-04-15 RX ORDER — AMLODIPINE BESYLATE 5 MG/1
1 TABLET TABLET ORAL ONCE A DAY
Status: ACTIVE | COMMUNITY

## 2024-04-15 RX ORDER — LISINOPRIL 20 MG/1
1 TABLET TABLET ORAL ONCE A DAY
Status: ACTIVE | COMMUNITY

## 2024-04-15 RX ORDER — OXYCODONE HYDROCHLORIDE 10 MG/1
1 TABLET AS NEEDED TABLET ORAL
Status: ACTIVE | COMMUNITY

## 2024-05-01 ENCOUNTER — LAB OUTSIDE AN ENCOUNTER (OUTPATIENT)
Dept: URBAN - METROPOLITAN AREA CLINIC 128 | Facility: CLINIC | Age: 69
End: 2024-05-01

## 2024-05-01 ENCOUNTER — DASHBOARD ENCOUNTERS (OUTPATIENT)
Age: 69
End: 2024-05-01

## 2024-05-01 ENCOUNTER — OFFICE VISIT (OUTPATIENT)
Dept: URBAN - METROPOLITAN AREA CLINIC 128 | Facility: CLINIC | Age: 69
End: 2024-05-01
Payer: COMMERCIAL

## 2024-05-01 VITALS
DIASTOLIC BLOOD PRESSURE: 86 MMHG | WEIGHT: 158 LBS | SYSTOLIC BLOOD PRESSURE: 129 MMHG | HEIGHT: 55 IN | HEART RATE: 85 BPM | TEMPERATURE: 97.9 F

## 2024-05-01 DIAGNOSIS — R13.19 ESOPHAGEAL DYSPHAGIA: ICD-10-CM

## 2024-05-01 DIAGNOSIS — Z87.19 HISTORY OF ESOPHAGEAL STRICTURE: ICD-10-CM

## 2024-05-01 PROCEDURE — 99214 OFFICE O/P EST MOD 30 MIN: CPT | Performed by: INTERNAL MEDICINE

## 2024-05-01 RX ORDER — AMLODIPINE BESYLATE 5 MG/1
1 TABLET TABLET ORAL ONCE A DAY
Status: DISCONTINUED | COMMUNITY

## 2024-05-01 RX ORDER — OMEPRAZOLE 40 MG/1
1 CAPSULE 30 MINUTES BEFORE MORNING MEAL CAPSULE, DELAYED RELEASE ORAL ONCE A DAY
Status: DISCONTINUED | COMMUNITY

## 2024-05-01 RX ORDER — ONDANSETRON 4 MG/1
DISSOLVE 1 TABLET ON THE TONGUE EVERY 6 HOURS TABLET, ORALLY DISINTEGRATING ORAL
Qty: 60 TABLET | Refills: 0 | Status: DISCONTINUED | COMMUNITY

## 2024-05-01 RX ORDER — LISINOPRIL 20 MG/1
1 TABLET TABLET ORAL ONCE A DAY
Status: DISCONTINUED | COMMUNITY

## 2024-05-01 RX ORDER — OXYCODONE HYDROCHLORIDE 10 MG/1
1 TABLET AS NEEDED TABLET ORAL
Status: DISCONTINUED | COMMUNITY

## 2024-05-09 ENCOUNTER — TELEPHONE ENCOUNTER (OUTPATIENT)
Dept: URBAN - METROPOLITAN AREA CLINIC 128 | Facility: CLINIC | Age: 69
End: 2024-05-09

## 2024-05-09 ENCOUNTER — LAB OUTSIDE AN ENCOUNTER (OUTPATIENT)
Dept: URBAN - METROPOLITAN AREA CLINIC 128 | Facility: CLINIC | Age: 69
End: 2024-05-09

## 2024-08-01 ENCOUNTER — OFFICE VISIT (OUTPATIENT)
Dept: URBAN - METROPOLITAN AREA MEDICAL CENTER 25 | Facility: MEDICAL CENTER | Age: 69
End: 2024-08-01
Payer: COMMERCIAL

## 2024-08-01 DIAGNOSIS — K22.0 ABNORMAL LOWER ESOPHAGEAL SPHINCTER RELAXATION: ICD-10-CM

## 2024-08-01 PROCEDURE — 43236 UPPR GI SCOPE W/SUBMUC INJ: CPT | Performed by: INTERNAL MEDICINE

## 2024-08-13 ENCOUNTER — WEB ENCOUNTER (OUTPATIENT)
Dept: URBAN - METROPOLITAN AREA CLINIC 128 | Facility: CLINIC | Age: 69
End: 2024-08-13

## 2024-09-07 ENCOUNTER — CLAIMS CREATED FROM THE CLAIM WINDOW (OUTPATIENT)
Dept: URBAN - METROPOLITAN AREA MEDICAL CENTER 25 | Facility: MEDICAL CENTER | Age: 69
End: 2024-09-07
Payer: COMMERCIAL

## 2024-09-07 DIAGNOSIS — T18.128A FOOD IMPACTION OF ESOPHAGUS: ICD-10-CM

## 2024-09-07 DIAGNOSIS — R93.3 ABN FINDINGS-GI TRACT: ICD-10-CM

## 2024-09-07 DIAGNOSIS — R13.19 DYSPHAGIA, ESOPHAGEAL: ICD-10-CM

## 2024-09-07 DIAGNOSIS — R11.2 NAUSEA AND VOMITING: ICD-10-CM

## 2024-09-07 PROCEDURE — 99255 IP/OBS CONSLTJ NEW/EST HI 80: CPT | Performed by: INTERNAL MEDICINE

## 2024-09-07 PROCEDURE — G8427 DOCREV CUR MEDS BY ELIG CLIN: HCPCS | Performed by: INTERNAL MEDICINE

## 2024-09-07 PROCEDURE — 43247 EGD REMOVE FOREIGN BODY: CPT | Performed by: INTERNAL MEDICINE

## 2024-09-07 PROCEDURE — 99223 1ST HOSP IP/OBS HIGH 75: CPT | Performed by: INTERNAL MEDICINE

## 2024-09-07 PROCEDURE — 43235 EGD DIAGNOSTIC BRUSH WASH: CPT | Performed by: INTERNAL MEDICINE

## 2024-09-09 ENCOUNTER — TELEPHONE ENCOUNTER (OUTPATIENT)
Dept: URBAN - METROPOLITAN AREA CLINIC 128 | Facility: CLINIC | Age: 69
End: 2024-09-09

## 2024-09-10 ENCOUNTER — OFFICE VISIT (OUTPATIENT)
Dept: URBAN - METROPOLITAN AREA CLINIC 128 | Facility: CLINIC | Age: 69
End: 2024-09-10
Payer: COMMERCIAL

## 2024-09-10 ENCOUNTER — TELEPHONE ENCOUNTER (OUTPATIENT)
Dept: URBAN - METROPOLITAN AREA CLINIC 80 | Facility: CLINIC | Age: 69
End: 2024-09-10

## 2024-09-10 VITALS
SYSTOLIC BLOOD PRESSURE: 120 MMHG | WEIGHT: 160.2 LBS | DIASTOLIC BLOOD PRESSURE: 74 MMHG | HEART RATE: 65 BPM | BODY MASS INDEX: 37.08 KG/M2 | TEMPERATURE: 97.2 F | HEIGHT: 55 IN

## 2024-09-10 DIAGNOSIS — R13.19 ESOPHAGEAL DYSPHAGIA: ICD-10-CM

## 2024-09-10 DIAGNOSIS — K22.0 ACHALASIA, ESOPHAGEAL: ICD-10-CM

## 2024-09-10 PROBLEM — 45564002: Status: ACTIVE | Noted: 2024-09-10

## 2024-09-10 PROCEDURE — 99213 OFFICE O/P EST LOW 20 MIN: CPT | Performed by: PHYSICIAN ASSISTANT

## 2024-09-10 NOTE — HPI-TODAY'S VISIT:
Patient for f/u for [facility] due to nausea and vomiting. Patient with diagnosis of achalasia and had episodes of vomiting after eating some green beans and steak, small amount. She had EGD 9/7/2024 for removal of food bolus. Her stomach and duodenum were normal. Patient is tolerating liquid diet, maintaining her weight. She denies any abdominal pain, no nausea, she is crushing her meds and taking them with pudding. She had previous EGD with botox injection on 8/1/2024 Patient seen by Dr Josiah Humphreys on 12/2021 and had: redo laparoscopic hiatal hernia repair with mesh. s/p Redo Heller Myotomy. Gastric wedge resection. Fibroscopic esophagogastroscopy.

## 2024-09-13 ENCOUNTER — OFFICE VISIT (OUTPATIENT)
Dept: URBAN - METROPOLITAN AREA CLINIC 128 | Facility: CLINIC | Age: 69
End: 2024-09-13

## 2024-09-13 ENCOUNTER — TELEPHONE ENCOUNTER (OUTPATIENT)
Dept: URBAN - METROPOLITAN AREA CLINIC 128 | Facility: CLINIC | Age: 69
End: 2024-09-13